# Patient Record
Sex: MALE | Race: WHITE
[De-identification: names, ages, dates, MRNs, and addresses within clinical notes are randomized per-mention and may not be internally consistent; named-entity substitution may affect disease eponyms.]

---

## 2018-05-06 ENCOUNTER — HOSPITAL ENCOUNTER (EMERGENCY)
Dept: HOSPITAL 47 - EC | Age: 28
Discharge: HOME | End: 2018-05-06
Payer: COMMERCIAL

## 2018-05-06 VITALS
HEART RATE: 61 BPM | TEMPERATURE: 98.6 F | SYSTOLIC BLOOD PRESSURE: 133 MMHG | RESPIRATION RATE: 18 BRPM | DIASTOLIC BLOOD PRESSURE: 80 MMHG

## 2018-05-06 DIAGNOSIS — G43.909: Primary | ICD-10-CM

## 2018-05-06 DIAGNOSIS — R55: ICD-10-CM

## 2018-05-06 DIAGNOSIS — Z88.0: ICD-10-CM

## 2018-05-06 DIAGNOSIS — F17.200: ICD-10-CM

## 2018-05-06 DIAGNOSIS — Z88.1: ICD-10-CM

## 2018-05-06 LAB
ANION GAP SERPL CALC-SCNC: 14 MMOL/L
BASOPHILS # BLD AUTO: 0 K/UL (ref 0–0.2)
BASOPHILS NFR BLD AUTO: 0 %
BUN SERPL-SCNC: 12 MG/DL (ref 9–20)
CALCIUM SPEC-MCNC: 9.4 MG/DL (ref 8.4–10.2)
CHLORIDE SERPL-SCNC: 104 MMOL/L (ref 98–107)
CO2 SERPL-SCNC: 27 MMOL/L (ref 22–30)
EOSINOPHIL # BLD AUTO: 0.2 K/UL (ref 0–0.7)
EOSINOPHIL NFR BLD AUTO: 1 %
ERYTHROCYTE [DISTWIDTH] IN BLOOD BY AUTOMATED COUNT: 5.28 M/UL (ref 4.3–5.9)
ERYTHROCYTE [DISTWIDTH] IN BLOOD: 11.9 % (ref 11.5–15.5)
GLUCOSE SERPL-MCNC: 82 MG/DL (ref 74–99)
HCT VFR BLD AUTO: 44.5 % (ref 39–53)
HGB BLD-MCNC: 16.1 GM/DL (ref 13–17.5)
LYMPHOCYTES # SPEC AUTO: 1.8 K/UL (ref 1–4.8)
LYMPHOCYTES NFR SPEC AUTO: 17 %
MCH RBC QN AUTO: 30.5 PG (ref 25–35)
MCHC RBC AUTO-ENTMCNC: 36.2 G/DL (ref 31–37)
MCV RBC AUTO: 84.2 FL (ref 80–100)
MONOCYTES # BLD AUTO: 0.7 K/UL (ref 0–1)
MONOCYTES NFR BLD AUTO: 6 %
NEUTROPHILS # BLD AUTO: 7.9 K/UL (ref 1.3–7.7)
NEUTROPHILS NFR BLD AUTO: 74 %
PH UR: 7.5 [PH] (ref 5–8)
PLATELET # BLD AUTO: 228 K/UL (ref 150–450)
POTASSIUM SERPL-SCNC: 4.4 MMOL/L (ref 3.5–5.1)
SODIUM SERPL-SCNC: 145 MMOL/L (ref 137–145)
SP GR UR: 1.02 (ref 1–1.03)
UROBILINOGEN UR QL STRIP: <2 MG/DL (ref ?–2)
WBC # BLD AUTO: 10.7 K/UL (ref 3.8–10.6)

## 2018-05-06 PROCEDURE — 96361 HYDRATE IV INFUSION ADD-ON: CPT

## 2018-05-06 PROCEDURE — 80048 BASIC METABOLIC PNL TOTAL CA: CPT

## 2018-05-06 PROCEDURE — 81001 URINALYSIS AUTO W/SCOPE: CPT

## 2018-05-06 PROCEDURE — 93005 ELECTROCARDIOGRAM TRACING: CPT

## 2018-05-06 PROCEDURE — 99284 EMERGENCY DEPT VISIT MOD MDM: CPT

## 2018-05-06 PROCEDURE — 36415 COLL VENOUS BLD VENIPUNCTURE: CPT

## 2018-05-06 PROCEDURE — 71046 X-RAY EXAM CHEST 2 VIEWS: CPT

## 2018-05-06 PROCEDURE — 85025 COMPLETE CBC W/AUTO DIFF WBC: CPT

## 2018-05-06 PROCEDURE — 96374 THER/PROPH/DIAG INJ IV PUSH: CPT

## 2018-05-06 PROCEDURE — 96375 TX/PRO/DX INJ NEW DRUG ADDON: CPT

## 2018-05-06 NOTE — XR
EXAMINATION TYPE: XR chest 2V

 

DATE OF EXAM: 5/6/2018

 

COMPARISON: NONE

 

INDICATION: Pain

 

TECHNIQUE:  Frontal and lateral views of the chest are obtained.

 

FINDINGS:  

The heart size is normal.  

The pulmonary vasculature is normal.

The lungs are clear.

 

IMPRESSION:  

1. No acute pulmonary process.

## 2018-05-06 NOTE — ED
General Adult HPI





- General


Chief complaint: Syncope


Stated complaint: Snycope, Migraines


Time Seen by Provider: 05/06/18 14:10


Source: patient


Mode of arrival: ambulatory


Limitations: no limitations





- History of Present Illness


Initial comments: 


Patient is a 27-year-old male presents with chief complaint of migraine 

headache and a syncopal episode today.  Patient states she's had a headache for 

about 3 or 4 days.  It is all left side of his head, he characterizes it as an 

ache.  Patient cannot identify an inciting incident.  He states he has a 

history of migraine headaches but does not take any medications regularly for 

them.  I relating factors include light and sound.  There are no alleviating 

factors.  Patient states that today he had a syncopal episode.  He was walking 

down a hallway, and passed out.  Patient states that this happened to him in 

the past but he did not seek medical attention.  He denies any trauma from the 

fall.  No other complaints today.








- Related Data


 Home Medications











 Medication  Instructions  Recorded  Confirmed


 


Acetaminophen [Tylenol Extra 100 mg PO BID PRN 12/07/17 12/07/17





Strength]   


 


Ibuprofen [Motrin] 800 mg PO BID PRN 12/07/17 12/07/17








 Previous Rx's











 Medication  Instructions  Recorded


 


Ibuprofen [Motrin] 600 mg PO Q6HR PRN #30 day 12/07/17











 Allergies











Allergy/AdvReac Type Severity Reaction Status Date / Time


 


amoxicillin Allergy  Rash/Hives Verified 05/06/18 13:57


 


cefaclor [From Ceclor] Allergy  Rash/Hives Verified 05/06/18 13:57


 


Penicillins Allergy  Rash/Hives Verified 05/06/18 13:57














Review of Systems


ROS Statement: 


Those systems with pertinent positive or pertinent negative responses have been 

documented in the HPI.





ROS Other: All systems not noted in ROS Statement are negative.


Neurological: Reports: headache





Past Medical History


Past Medical History: No Reported History


History of Any Multi-Drug Resistant Organisms: None Reported


Past Surgical History: No Surgical Hx Reported


Past Psychological History: No Psychological Hx Reported


Smoking Status: Current some day smoker


Past Alcohol Use History: Occasional


Past Drug Use History: None Reported





General Exam


Limitations: no limitations


General appearance: alert, in no apparent distress


Head exam: Present: atraumatic, normocephalic


Eye exam: Present: normal appearance, PERRL, EOMI.  Absent: scleral icterus


ENT exam: Present: mucous membranes moist


Neck exam: Present: normal inspection, full ROM.  Absent: tenderness, 

meningismus, lymphadenopathy


Respiratory exam: Present: normal lung sounds bilaterally.  Absent: respiratory 

distress, wheezes


Cardiovascular Exam: Present: regular rate, normal rhythm


GI/Abdominal exam: Present: soft.  Absent: distended, tenderness


Rectal exam: Present: deferred


Extremities exam: Present: normal inspection


Back exam: Present: normal inspection, full ROM


Neurological exam: Present: alert, oriented X3


Psychiatric exam: Present: normal affect, normal mood


Skin exam: Present: warm, dry, intact





Course


 Vital Signs











  05/06/18





  13:55


 


Temperature 98.1 F


 


Pulse Rate 69


 


Respiratory 20





Rate 


 


Blood Pressure 131/82


 


O2 Sat by Pulse 100





Oximetry 














Medical Decision Making





- Medical Decision Making


Patient is a 27-year-old male presents with a chief complaint of a headache and 

syncopal episode.  On initial evaluation, vital signs are stable, patient is no 

acute distress.  Neurologic exam is unremarkable.  Patient is able to ambulate 

well without assistance, finger to nose testing is within normal limits.  

Romberg is negative.  There are no physical signs of trauma from patient's 

stated fall.  I do not believe neuroimaging is warranted at this time.  He'll 

be treated with IV fluids, and a headache cocktail.  Will get EKG and chest x-

ray to rule out other causes of syncope.  EKG performed at 1506 shows NSR with 

a rate of 60 BPM.  EKG otherwise unremarkable.  





3:37 PM


On reevaluation, as his headache is improving.  Lab evaluation is normal.  

Chest x-ray shows no acute process.  At this time does not appear to be any life

-threatening etiologies of patient's presentation.  He is instructed to follow 

up with primary care in 1-2 days, return to the emergency department if 

symptoms worsen or change.








- Lab Data


Result diagrams: 


 05/06/18 14:45





 05/06/18 14:45


 Lab Results











  05/06/18 05/06/18 05/06/18 Range/Units





  14:45 14:45 14:45 


 


WBC   10.7 H   (3.8-10.6)  k/uL


 


RBC   5.28   (4.30-5.90)  m/uL


 


Hgb   16.1   (13.0-17.5)  gm/dL


 


Hct   44.5   (39.0-53.0)  %


 


MCV   84.2   (80.0-100.0)  fL


 


MCH   30.5   (25.0-35.0)  pg


 


MCHC   36.2   (31.0-37.0)  g/dL


 


RDW   11.9   (11.5-15.5)  %


 


Plt Count   228   (150-450)  k/uL


 


Neutrophils %   74   %


 


Lymphocytes %   17   %


 


Monocytes %   6   %


 


Eosinophils %   1   %


 


Basophils %   0   %


 


Neutrophils #   7.9 H   (1.3-7.7)  k/uL


 


Lymphocytes #   1.8   (1.0-4.8)  k/uL


 


Monocytes #   0.7   (0-1.0)  k/uL


 


Eosinophils #   0.2   (0-0.7)  k/uL


 


Basophils #   0.0   (0-0.2)  k/uL


 


Sodium  145    (137-145)  mmol/L


 


Potassium  4.4    (3.5-5.1)  mmol/L


 


Chloride  104    ()  mmol/L


 


Carbon Dioxide  27    (22-30)  mmol/L


 


Anion Gap  14    mmol/L


 


BUN  12    (9-20)  mg/dL


 


Creatinine  0.91    (0.66-1.25)  mg/dL


 


Est GFR (CKD-EPI)AfAm  >90    (>60 ml/min/1.73 sqM)  


 


Est GFR (CKD-EPI)NonAf  >90    (>60 ml/min/1.73 sqM)  


 


Glucose  82    (74-99)  mg/dL


 


Calcium  9.4    (8.4-10.2)  mg/dL


 


Urine Color    Yellow  


 


Urine Appearance    Turbid  (Clear)  


 


Urine pH    7.5  (5.0-8.0)  


 


Ur Specific Gravity    1.022  (1.001-1.035)  


 


Urine Protein    Trace H  (Negative)  


 


Urine Glucose (UA)    Negative  (Negative)  


 


Urine Ketones    Negative  (Negative)  


 


Urine Blood    Negative  (Negative)  


 


Urine Nitrite    Negative  (Negative)  


 


Urine Bilirubin    Negative  (Negative)  


 


Urine Urobilinogen    <2.0  (<2.0)  mg/dL


 


Ur Leukocyte Esterase    Negative  (Negative)  


 


Amorphous Sediment    Few H  (None)  /hpf


 


Urine Mucus    Few H  (None)  /hpf


 


Urine Sperm    Rare  (None)  /hpf














Disposition


Clinical Impression: 


 Migraine headache, Syncope





Disposition: HOME SELF-CARE


Condition: Good


Instructions:  Syncope (ED), General Headache (ED)


Is patient prescribed a controlled substance at d/c from ED?: No


Referrals: 


None,Stated [Primary Care Provider] - 1-2 days


Dyer,Kim, MD [STAFF PHYSICIAN] - 1-2 days

## 2018-06-19 ENCOUNTER — HOSPITAL ENCOUNTER (EMERGENCY)
Dept: HOSPITAL 47 - EC | Age: 28
Discharge: HOME | End: 2018-06-19
Payer: COMMERCIAL

## 2018-06-19 VITALS
SYSTOLIC BLOOD PRESSURE: 138 MMHG | TEMPERATURE: 98.3 F | HEART RATE: 70 BPM | RESPIRATION RATE: 18 BRPM | DIASTOLIC BLOOD PRESSURE: 68 MMHG

## 2018-06-19 DIAGNOSIS — F17.200: ICD-10-CM

## 2018-06-19 DIAGNOSIS — Z02.79: ICD-10-CM

## 2018-06-19 DIAGNOSIS — M25.512: Primary | ICD-10-CM
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
## 2018-06-19 PROCEDURE — 99283 EMERGENCY DEPT VISIT LOW MDM: CPT
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
